# Patient Record
Sex: MALE | Race: WHITE
[De-identification: names, ages, dates, MRNs, and addresses within clinical notes are randomized per-mention and may not be internally consistent; named-entity substitution may affect disease eponyms.]

---

## 2018-04-01 ENCOUNTER — HOSPITAL ENCOUNTER (EMERGENCY)
Dept: HOSPITAL 56 - MW.ED | Age: 48
Discharge: LEFT BEFORE DISCHARGE | End: 2018-04-01
Payer: COMMERCIAL

## 2018-04-01 VITALS — SYSTOLIC BLOOD PRESSURE: 130 MMHG | DIASTOLIC BLOOD PRESSURE: 87 MMHG

## 2018-04-01 DIAGNOSIS — Z53.21: Primary | ICD-10-CM

## 2018-04-01 NOTE — EDM.PDOC
ED HPI GENERAL MEDICAL PROBLEM





- General


Chief Complaint: Upper Extremity Injury/Pain


Stated Complaint: LEFT ELBOW PAIN


  ** left elbow


Pain Score (Numeric/FACES): 1





- Related Data


 Allergies











Allergy/AdvReac Type Severity Reaction Status Date / Time


 


No Known Allergies Allergy   Verified 04/01/18 05:51











Home Meds: 


 Home Meds





. [No Known Home Meds]  04/01/18 [History]











Past Medical History





- Past Health History


Medical/Surgical History: Denies Medical/Surgical History


Gastrointestinal History: Reports: GERD


Other Gastrointestinal History: difficulty swallowing


Other Musculoskeletal History: occasional back pain





Social & Family History





- Family History


Family Medical History: Noncontributory





- Tobacco Use


Smoking Status *Q: Never Smoker


Second Hand Smoke Exposure: No





- Caffeine Use


Caffeine Use: Reports: Coffee





- Recreational Drug Use


Recreational Drug Use: No


Drug Use in Last 12 Months: No





Course





- Vital Signs


Last Recorded V/S: 





 Last Vital Signs











Temp  97.8 F   04/01/18 05:48


 


Pulse  71   04/01/18 05:48


 


Resp  18   04/01/18 05:48


 


BP  130/87   04/01/18 05:48


 


Pulse Ox  95   04/01/18 05:48














Departure





- Discharge Information


Referrals: 


PCP,None [Primary Care Provider] -